# Patient Record
Sex: MALE | Race: OTHER | URBAN - METROPOLITAN AREA
[De-identification: names, ages, dates, MRNs, and addresses within clinical notes are randomized per-mention and may not be internally consistent; named-entity substitution may affect disease eponyms.]

---

## 2021-01-04 ENCOUNTER — OUTPATIENT (OUTPATIENT)
Dept: OUTPATIENT SERVICES | Facility: HOSPITAL | Age: 31
LOS: 1 days | Discharge: HOME | End: 2021-01-04

## 2021-01-04 ENCOUNTER — APPOINTMENT (OUTPATIENT)
Dept: INTERNAL MEDICINE | Facility: CLINIC | Age: 31
End: 2021-01-04
Payer: MEDICAID

## 2021-01-04 VITALS
OXYGEN SATURATION: 97 % | BODY MASS INDEX: 22.62 KG/M2 | SYSTOLIC BLOOD PRESSURE: 124 MMHG | HEIGHT: 70 IN | WEIGHT: 158 LBS | TEMPERATURE: 98.2 F | HEART RATE: 66 BPM | DIASTOLIC BLOOD PRESSURE: 74 MMHG

## 2021-01-04 DIAGNOSIS — Z80.8 FAMILY HISTORY OF MALIGNANT NEOPLASM OF OTHER ORGANS OR SYSTEMS: ICD-10-CM

## 2021-01-04 DIAGNOSIS — Z83.49 FAMILY HISTORY OF OTHER ENDOCRINE, NUTRITIONAL AND METABOLIC DISEASES: ICD-10-CM

## 2021-01-04 DIAGNOSIS — Z00.00 ENCOUNTER FOR GENERAL ADULT MEDICAL EXAMINATION W/OUT ABNORMAL FINDINGS: ICD-10-CM

## 2021-01-04 DIAGNOSIS — Z86.11 PERSONAL HISTORY OF TUBERCULOSIS: ICD-10-CM

## 2021-01-04 DIAGNOSIS — D22.9 MELANOCYTIC NEVI, UNSPECIFIED: ICD-10-CM

## 2021-01-04 DIAGNOSIS — Z23 ENCOUNTER FOR IMMUNIZATION: ICD-10-CM

## 2021-01-04 PROCEDURE — 99214 OFFICE O/P EST MOD 30 MIN: CPT | Mod: GC

## 2021-01-04 NOTE — ASSESSMENT
[FreeTextEntry1] : 30 year old male with hx of TB in 2017 completed treatment and currently asymptomatic and multiple atypical mole removal presents to [facility] care\par \par # Atypical moles\par - had pathology done on a mole which was negative, family wants him to get second opinion on moles in the head, neck, and chest for which he needs a PCP referral (ordered)\par \par # HCM\par - had flu shot\par - routine labs ordered\par - f/u in 3 months

## 2021-01-04 NOTE — END OF VISIT
[] : Resident [FreeTextEntry3] : Pt. is a 29yo male with multiple moles on his body, has had previous skin biopsy by dermatologist, reported negative biopsy finding.  Since his father also has skin moles with diagnosis of melanoma, wants to have derm referral for further evaluation.  Will refer pt. to derm. and blood work ordered.  RTC 1 year or prn.  Pt. states he had flu vaccine in 9/2020 at [facility]

## 2021-01-04 NOTE — HISTORY OF PRESENT ILLNESS
[FreeTextEntry1] : establish care [de-identified] : 30 year old male with hx of TB in 2017 completed treatment and currently asymptomatic and multiple atypical mole removal presents to [facility] care. He has no complaints today.  He is coming to [facility] care to see a dermatologist.  The moles are in the head, neck, and chest.  He has family who are concerned so he wants a second opinion.  He had pathology on a mole already removed that was negative for malignancy.